# Patient Record
Sex: FEMALE | Race: WHITE | NOT HISPANIC OR LATINO | Employment: FULL TIME | URBAN - METROPOLITAN AREA
[De-identification: names, ages, dates, MRNs, and addresses within clinical notes are randomized per-mention and may not be internally consistent; named-entity substitution may affect disease eponyms.]

---

## 2022-10-27 ENCOUNTER — LAB REQUISITION (OUTPATIENT)
Dept: LAB | Facility: CLINIC | Age: 67
End: 2022-10-27

## 2022-10-27 LAB
HBV SURFACE AB SERPL IA-ACNC: 0.88 M[IU]/ML
HBV SURFACE AB SERPL IA-ACNC: NONREACTIVE M[IU]/ML

## 2022-10-27 PROCEDURE — 86706 HEP B SURFACE ANTIBODY: CPT | Performed by: INTERNAL MEDICINE

## 2022-10-27 PROCEDURE — 86735 MUMPS ANTIBODY: CPT | Performed by: INTERNAL MEDICINE

## 2022-10-27 PROCEDURE — 86765 RUBEOLA ANTIBODY: CPT | Performed by: INTERNAL MEDICINE

## 2022-10-27 PROCEDURE — 86787 VARICELLA-ZOSTER ANTIBODY: CPT | Performed by: INTERNAL MEDICINE

## 2022-10-27 PROCEDURE — 86481 TB AG RESPONSE T-CELL SUSP: CPT | Performed by: INTERNAL MEDICINE

## 2022-10-27 PROCEDURE — 86762 RUBELLA ANTIBODY: CPT | Performed by: INTERNAL MEDICINE

## 2022-10-28 LAB
MEV IGG SER IA-ACNC: >300 AU/ML
MEV IGG SER IA-ACNC: POSITIVE
MUMPS ANTIBODY IGG INSTRUMENT VALUE: >300 AU/ML
MUV IGG SER QL IA: POSITIVE
RUBV IGG SERPL QL IA: 3.86 INDEX
RUBV IGG SERPL QL IA: POSITIVE
VZV IGG SER QL IA: 761.3 INDEX
VZV IGG SER QL IA: POSITIVE

## 2022-10-29 LAB
GAMMA INTERFERON BACKGROUND BLD IA-ACNC: 0.03 IU/ML
M TB IFN-G BLD-IMP: NEGATIVE
M TB IFN-G CD4+ BCKGRND COR BLD-ACNC: 9.97 IU/ML
MITOGEN IGNF BCKGRD COR BLD-ACNC: 0 IU/ML
MITOGEN IGNF BCKGRD COR BLD-ACNC: 0 IU/ML
QUANTIFERON MITOGEN: 10 IU/ML
QUANTIFERON NIL TUBE: 0.03 IU/ML
QUANTIFERON TB1 TUBE: 0.03 IU/ML
QUANTIFERON TB2 TUBE: 0.03

## 2024-07-23 ENCOUNTER — TRANSFERRED RECORDS (OUTPATIENT)
Dept: HEALTH INFORMATION MANAGEMENT | Facility: CLINIC | Age: 69
End: 2024-07-23
Payer: COMMERCIAL

## 2024-07-26 ENCOUNTER — TELEPHONE (OUTPATIENT)
Dept: OPHTHALMOLOGY | Facility: CLINIC | Age: 69
End: 2024-07-26
Payer: COMMERCIAL

## 2024-07-26 NOTE — TELEPHONE ENCOUNTER
----- Message from Iram Tamez sent at 7/23/2024  2:17 PM CDT -----  Regarding: RE: Referral from Shelby Flores OD  New adult strabismus  ----- Message -----  From: Merry Duvall  Sent: 7/23/2024   1:58 PM CDT  To: AJ Hameed; Melanie Jeans  Subject: RE: Referral from LORETTA Vernon!     What should I schedule this under?     Merry Restrepo  ----- Message -----  From: Iram Tamez CO  Sent: 7/23/2024   1:17 PM CDT  To: [facility]  Subject: Referral from Shelby Flores OD                    Please call to schedule with first available with Raul Tapia or Marty    [address] palsy    [phone]    Thank you!   Flori has referral notes to scan in

## 2024-09-12 ENCOUNTER — TELEPHONE (OUTPATIENT)
Dept: OPHTHALMOLOGY | Facility: CLINIC | Age: 69
End: 2024-09-12
Payer: COMMERCIAL

## 2024-09-12 NOTE — TELEPHONE ENCOUNTER
----- Message from Iram Tamez sent at 9/12/2024  2:09 PM CDT -----  Regarding: referall Dr. Flores, OD  Could you try calling her again to set up apt with Dr. Schulz? She called Dr. Flores to say she hasn't heard from us. Looks like you left a message tho.       Thank you!    Myrtle Ramirez    1955    Left [address]th [address] palsy    [phone]

## 2024-09-12 NOTE — TELEPHONE ENCOUNTER
[facility] Call Center    Phone Message    May a detailed message be left on voicemail: yes     Reason for Call: Other: Patient called regarding previous TE for scheduling new neuro with Dr. Washington. She was given a few different appointment dates but states the only day that would work for her so her son can bring her is October 28th. Writer saw no appointments that day so patient wanted to ask the clinic if there was a way to get her seen that day. She states she can come in as early as 730AM . Please call to advise.      Action Taken: Message routed to:  [facility] Eye    Travel Screening: Not Applicable

## 2024-09-12 NOTE — TELEPHONE ENCOUNTER
[facility] Call Center    Phone Message    May a detailed message be left on voicemail: no     Reason for Call: Other: FYI pt called scheduling call center and was given next avail new neuro eye slots with Dr Schulz and is checking with her son for transportation for appt and will be calling scheduling department back either today or on 9/13/24 to schedule her appt      Action Taken: Message routed to:  [facility] eye    Travel Screening: Not Applicable     Date of Service:

## 2024-09-12 NOTE — TELEPHONE ENCOUNTER
----- Message from Iram Tamez sent at 9/12/2024  2:09 PM CDT -----  Regarding: referall Dr. Flores, OD  Could you try calling her again to set up apt with Dr. Schulz? She called Dr. Flores to say she hasn't heard from us. Looks like you left a message tho.       Thank you!    Myrtle Ramirez    1955    Left [address] palsy    [phone]

## 2024-09-13 NOTE — TELEPHONE ENCOUNTER
[facility] Call Center    Phone Message    May a detailed message be left on voicemail: yes     Reason for Call: Other: Pt is returning the clinic's call regarding scheduling with Dr. Schulz on 10/28. She prefers earlier in the day, the better, but she can come any time. Ok to schedule time and call pt with details. Thank you.     Action Taken: Message routed to:  [facility] ([facility]): EYE    Travel Screening: Not Applicable     Date of Service:            CLINIC:  Left vm message with Myrtle stating [facility] doesn't have clinic on the 28th. But we could do Wednesdays or some Thursday and Friday moirnings. Left call center to call back on.     Merry

## 2024-09-20 ENCOUNTER — TRANSFERRED RECORDS (OUTPATIENT)
Dept: HEALTH INFORMATION MANAGEMENT | Facility: CLINIC | Age: 69
End: 2024-09-20

## 2024-09-26 ENCOUNTER — TRANSFERRED RECORDS (OUTPATIENT)
Dept: HEALTH INFORMATION MANAGEMENT | Facility: CLINIC | Age: 69
End: 2024-09-26
Payer: COMMERCIAL

## 2024-10-04 ENCOUNTER — MEDICAL CORRESPONDENCE (OUTPATIENT)
Dept: HEALTH INFORMATION MANAGEMENT | Facility: CLINIC | Age: 69
End: 2024-10-04
Payer: COMMERCIAL

## 2024-10-09 ENCOUNTER — TELEPHONE (OUTPATIENT)
Dept: OTOLARYNGOLOGY | Facility: CLINIC | Age: 69
End: 2024-10-09
Payer: COMMERCIAL

## 2024-10-09 NOTE — TELEPHONE ENCOUNTER
[facility] Call Center    Phone Message    May a detailed message be left on voicemail: yes     Reason for Call: Other: Pt called to schedule appt from referral for imbalance. Pt wants to be seen at [facility]. Sending TE per protocols     Action Taken: Other: [facility]    Travel Screening: Not Applicable     Date of Service:

## 2024-10-17 NOTE — TELEPHONE ENCOUNTER
LVM to schedule      Vestibular Eval with ENT PT for CDP test, then VNG with audiology after      Gave direct number

## 2024-10-18 NOTE — TELEPHONE ENCOUNTER
Patient confirmed scheduled appointment:  Date: 11/18  Time: 845  Provider:   Location: [facility]   Testing/imaging: VNG and CDP test  Additional notes:

## 2024-11-08 ENCOUNTER — OFFICE VISIT (OUTPATIENT)
Dept: OPHTHALMOLOGY | Facility: CLINIC | Age: 69
End: 2024-11-08
Attending: OPHTHALMOLOGY
Payer: COMMERCIAL

## 2024-11-08 DIAGNOSIS — H49.12: Primary | ICD-10-CM

## 2024-11-08 DIAGNOSIS — H53.2 DOUBLE VISION: ICD-10-CM

## 2024-11-08 DIAGNOSIS — H50.22 HYPERTROPIA OF LEFT EYE: ICD-10-CM

## 2024-11-08 PROCEDURE — 92060 SENSORIMOTOR EXAMINATION: CPT

## 2024-11-08 PROCEDURE — G0463 HOSPITAL OUTPT CLINIC VISIT: HCPCS | Performed by: OPHTHALMOLOGY

## 2024-11-08 PROCEDURE — 92060 SENSORIMOTOR EXAMINATION: CPT | Performed by: OPHTHALMOLOGY

## 2024-11-08 PROCEDURE — 99205 OFFICE O/P NEW HI 60 MIN: CPT | Mod: GC | Performed by: OPHTHALMOLOGY

## 2024-11-08 PROCEDURE — 92060 SENSORIMOTOR EXAMINATION: CPT | Mod: 26 | Performed by: OPHTHALMOLOGY

## 2024-11-08 PROCEDURE — 92015 DETERMINE REFRACTIVE STATE: CPT

## 2024-11-08 ASSESSMENT — REFRACTION_WEARINGRX
OD_VPRISM: 6.0
OD_VBASE: BASE DOWN
OS_AXIS: 135
OS_VPRISM: 6.0
OS_VPRISM: 7.0
OD_SPHERE: -2.25
OS_VBASE: BASE DOWN
OD_VPRISM: 6.0
OD_SPHERE: -4.00
OS_AXIS: 141
OS_CYLINDER: +0.50
OD_CYLINDER: +0.75
OS_SPHERE: -2.00
OD_VBASE: BASE UP
OS_SPHERE: -3.75
OS_CYLINDER: +0.50
OD_AXIS: 156
SPECS_TYPE: READING GLS
OD_CYLINDER: +0.50
OD_AXIS: 160
OS_VBASE: BASE DOWN

## 2024-11-08 ASSESSMENT — CONF VISUAL FIELD
OS_INFERIOR_NASAL_RESTRICTION: 0
OD_INFERIOR_NASAL_RESTRICTION: 0
OD_NORMAL: 1
OD_SUPERIOR_NASAL_RESTRICTION: 0
OD_INFERIOR_TEMPORAL_RESTRICTION: 0
OS_SUPERIOR_TEMPORAL_RESTRICTION: 0
METHOD: COUNTING FINGERS
OD_SUPERIOR_TEMPORAL_RESTRICTION: 0
OS_NORMAL: 1
OS_SUPERIOR_NASAL_RESTRICTION: 0
OS_INFERIOR_TEMPORAL_RESTRICTION: 0

## 2024-11-08 ASSESSMENT — CUP TO DISC RATIO
OS_RATIO: 0.2
OD_RATIO: 0.2

## 2024-11-08 ASSESSMENT — VISUAL ACUITY
METHOD: SNELLEN - LINEAR
OD_CC+: -1
CORRECTION_TYPE: GLASSES
OD_CC: 20/40
OS_CC: 20/25

## 2024-11-08 ASSESSMENT — SLIT LAMP EXAM - LIDS
COMMENTS: 1+ DERMATOCHALASIS - UPPER LID
COMMENTS: 1+ DERMATOCHALASIS - UPPER LID

## 2024-11-08 ASSESSMENT — EXTERNAL EXAM - LEFT EYE: OS_EXAM: NORMAL

## 2024-11-08 ASSESSMENT — TONOMETRY
OS_IOP_MMHG: 19
OD_IOP_MMHG: 16
IOP_METHOD: ICARE

## 2024-11-08 ASSESSMENT — REFRACTION_FINALRX
OS_VPRISM: 8.0
OS_VPRISM: 8.0
OD_VPRISM: 7.0
OD_VPRISM: 7.0

## 2024-11-08 ASSESSMENT — REFRACTION_MANIFEST
OD_ADD: +2.75
OS_AXIS: 105
OD_SPHERE: -4.25
OS_SPHERE: -4.00
OD_AXIS: 165
OD_CYLINDER: +0.75
OS_CYLINDER: +0.50
OS_ADD: +2.75

## 2024-11-08 ASSESSMENT — EXTERNAL EXAM - RIGHT EYE: OD_EXAM: NORMAL

## 2024-11-08 NOTE — PROGRESS NOTES
1. Left trochlear nerve palsy    2- CPA meningioma left side, status post craniotomy of the left side in 2021     3. Bilateral epiretinal membrane-patient is still correctable to 20/20 in the left eye and 20/30 in the right eye.  This does not appear to limit her fusion    Patient is a 69 year old female who started to have double vision a few days after her left sided craniotomy for a meningioma Sept 2021. Her diplopia/vertical has been overall stable subjectively since the time of her craniotomy.     She has been followed for remnant meningioma that is located along the lateral aspect of the left gay in the path of the left cranial nerve 4.  Since she has not had any changes in her strabismus subjectively since the sudden onset of diplopia at the time of surgery nor has the size of the remnant meningioma increased, it is very reasonable to offer her a strabismus surgery to help alleviate her diplopia.  The amount of ground in prism is requires for fusion is significantly more than most would tolerate and is likely to induce prismatic distortion to vision.    Discussed the risks, benefits, and alternatives of strabismus surgery and patient wishes to consider her options and let us know later if she wants to proceed.    Per her request I did give her an updated prescription for a bifocal with ground in prism and single vision readers with prism however I strongly suggested that she not fill this prescription and instead proceed with strabismus surgery given the large angle of prism required for fusion (15 prism diopters left hypertropia total) and based upon the incomitant nature of her strabismus which means that prism glasses are not going to function in all gaze positions particularly downgaze.    If she does proceed with strabismus surgery she will need a pair of prism free glasses to wear after surgery.  I also did explain to her that it is likely we would need to perform multiple staged strabismus  surgeries to get her the best outcome.     It is clear to me that with optimal surgical management this patient would have significantly improved visual function.  She is a good strabismus surgery candidate.    Letter to Dr. Flores    Myrtle Ramirez is a pleasant 69 year old White female who presents to my neuro-ophthalmology clinic today having been referred by Dr. 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  8 days later she had another surgery because of otorrhea with placement of fat for CSF leakage.  Right after surgery she noticed double vision, she is not sure if after she woke up from the surgery she had the double vision or not but it started probably a few days after the surgery.     She was patching her eye until January 2022, when she saw Dr. Chow, in [facility].  She was then referred to an orthotist and she was placed in Fresnel prisms which she liked it and she eventually got prism glasses.  She mentions that her double vision has been stable ever since, she was treated with 7 PD prisms in each eye and she is today.  There has not been any changes in her double vision.    She doesn't have a good depth of perception and she has problems coming down of stairs.    She also has balance issues, that started March 2024. This was after she got COVID. She has seen an ENT surgeon, and is having tested for vestibular issues. She also has hearing issues on the right side.     She feels week and more pressure in her head.     Independent historians:  Patient    Review of outside testing:    3/6/24 MRI head wwo contrast  Impression   1. Stable four meningiomas along the left hemisphere.  Most notable   meningioma along the left tentorial incisura demonstrates stable mild mass   effect upon the  "left lateral midbrain/gay.   2.  Stable mild chronic ischemic microvascular disease.     My interpretation performed today of outside testing:  I have independently reviewed MRI performed 3/6/24.  She has an enhancing mass consistent with meningioma along the left tentorium near the 4th and 5th cranial nerve.  This tumor appears in the course of the left cranial nerve IV    Review of outside clinical notes:    7/23/24 Visit with Dr. Flores    4/25/24 -- Visit with Dr. Ponce    Myrtle Ramirez is a 69 y.o. female who presents with:    1. Diplopia  2. Left-sided fourth cranial nerve palsy  3. Cerebellopontine angle meningioma ([facility])  4. Examination of eyes and vision  5. Myopia of both eyes    Stable left hypertropia likely from left 4th nerve palsy following neurosurgery. She is happy in her sunglasses and mostly happy in reading glasses though says she \"can't see\" sometimes to read her bible. Updated rx for distance provided. I would recommend she stay in a total of 15 vertical prism if new glasses are prescribed. I've encouraged her to follow up with ophthalmology for routine exam in case blurry vision at near is more than just refractive. Remainder of exam stable. Follow up with me as needed for diplopia. Myrtle expressed understanding reassurance and agreement with current plan. All questions answered.  Return if symptoms worsen or fail to improve.    Further details of the management plan can be found in the \"Patient Instructions\" section which was printed and given to the patient.  Attending Physician Attestation: Complete documentation of historical and exam elements from today's encounter can be found in the full encounter summary report (not reduplicated in this progress note). I personally obtained the chief complaint(s) and history of present illness. I confirmed and edited as necessary the review of systems, past medical/surgical history, family history, social history, and examination findings as " documented by others; and I examined the patient myself. I personally reviewed the relevant tests, images, and reports as documented above. I formulated and edited as necessary the assessment and plan and discussed the findings and management plan with the patient and family. - Joyce Ponce MD     Past medical history:    Patient Active Problem List   Diagnosis    CARDIOVASCULAR SCREENING; LDL GOAL LESS THAN 160   Meningioma  Shoulder replacement  Knee replacement  Pulmonary emboly   MVR     Patient does not use any medicine now     Family history / social history:  Patient's Mom  because of COPD, dad passed away because of aspiration pneumonia     Patient  reports that she has never smoked. She does not have any smokeless tobacco history on file. She reports current alcohol use. She reports that she does not use drugs.     Exam:  Visual acuity 20/30 -2 right eye 20/20 left eye.  Color vision 11/11 right eye and 11/11 left eye.  Pupils none  Intraocular pressure 16 right eye and 19  left eye.  Anterior segment exam unremarkable.  Fundus exam round pink optic nerves on both sides, ERM on both sides.  Strabismus exam left hypertropia worsening in left gaze and left head tilt consistent with left superior oblique palsy    Cranial nerve exam 3, 6,7, 10 - 12 are normal and symmetric.     She has numbness on the sensory distribution of the left V2 and V3.     She has subjective hearing loss in the right ear.     Tests ordered and interpreted today:    Sensorimotor examination: The patient has full motility in the right eye.  In the left eye there is a -1.5 depression deficit in adduction.  There is a -1 depression deficit in primary gaze and abduction in the left eye. There is mild left inferior oblique overaction.  In primary gaze the patient has a left hypertropia measuring about 16 prism diopters that increases in right gaze to 18 prism diopters and decreases in upgaze to 14-15 prism diopters.  It measures  much larger in downgaze at 25 prism diopters.    On double Miguel laura testing I found her to have 10 degrees extorsion and left eye in primary gaze that increases in downgaze to 20 degrees    We discussed in detail the risks, benefits, and alternatives of eye muscle correction surgery including the very rare risk of death or serious morbidity from a general anesthesia complication and the rare risk of severe vision loss in the operative eye(s) secondary to retinal detachment or endophthalmitis.  We discussed more likely sub-optimal outcomes including the unanticipated need for additional strabismus surgery.  Finally the patient was aware that prisms glasses may be required to optimize single vision following surgery.    After a thorough discussion of these risks, the patient decided to consider strabismus surgery.  The surgical plan is as follows:      1. Eye muscle correction left eye    Left superior oblique advancement +/- left inferior oblique myectomy     Follow-up 1 week after strabismus surgery.    Plan to operate at: [facility]  Prism free glasses for adjustment: Non adjustable. Prescription given for prism free glasses       65 minutes were spent on the date of the encounter by me doing chart review, history and exam, documentation, and further activities as noted above    Complete documentation of historical and exam elements from today's encounter can be found in the full encounter summary report (not reduplicated in this progress note).  I personally obtained the chief complaint(s) and history of present illness.  I confirmed and edited as necessary the review of systems, past medical/surgical history, family history, social history, and examination findings as documented by others; and I examined the patient myself.  I personally reviewed the relevant tests, images, and reports as documented above.  I formulated and edited as necessary the assessment and plan and discussed the findings and management plan with  the patient and family.  I personally reviewed the ophthalmic test(s) associated with this encounter, agree with the interpretation(s) as documented by the resident/fellow, and have edited the corresponding report(s) as necessary.     MD Deshaun Mckinney MD: Neuro-ophthalmology fellow       Precharting:  Deshaun Velazquez MD   Fellow, Neuro-Ophthalmology

## 2024-11-08 NOTE — NURSING NOTE
Chief Complaint(s) and History of Present Illness(es)       Strabismus Evaluation    In both eyes.  Since onset it is stable.  Associated symptoms include imbalance.  Negative for blurred vision.             Comments    Myrtle Ramirez is a 69 year old female sent for consultation by Dr. Flores for strabismus sx consult.  Hx of CPA meningioma, s/p resection 2021.  She noticed vertical diplopia after her brain surgery.  Has tried Fresnel prisms and ground-in prism glasses and gradually increasing prisms.  Current glasses with total of 14pd vertical prism - lab was not able to fully get her full prism correction. Has a lot of balance issues, seeing ENT.  Will get occasional eye pain in the left eye, can occur a few times a month - lasts up to 10 seconds.  Would like to talk about surgery but not a priority at this point.  AJ Ring 11/8/2024 7:49 AM

## 2024-11-08 NOTE — LETTER
2024    RE: Myrtle Ramirez  : 1955  MRN: [id number]    Dear Dr. Flores    Thank you for referring your patient, Myrtle Ramirez, to my neuro-ophthalmology clinic recently.  After a thorough neuro-ophthalmic history and examination, I came to the following conclusions:     1. Left trochlear nerve palsy    2- CPA meningioma left side, status post craniotomy of the left side in      3. Bilateral epiretinal membrane-patient is still correctable to 20/20 in the left eye and 20/30 in the right eye.  This does not appear to limit her fusion    Patient is a 69 year old female who started to have double vision a few days after her left sided craniotomy for a meningioma Sept . Her diplopia/vertical has been overall stable subjectively since the time of her craniotomy.     She has been followed for remnant meningioma that is located along the lateral aspect of the left gay in the path of the left cranial nerve 4.  Since she has not had any changes in her strabismus subjectively since the sudden onset of diplopia at the time of surgery nor has the size of the remnant meningioma increased, it is very reasonable to offer her a strabismus surgery to help alleviate her diplopia.  The amount of ground in prism is requires for fusion is significantly more than most would tolerate and is likely to induce prismatic distortion to vision.    Discussed the risks, benefits, and alternatives of strabismus surgery and patient wishes to consider her options and let us know later if she wants to proceed.    Per her request I did give her an updated prescription for a bifocal with ground in prism and single vision readers with prism however I strongly suggested that she not fill this prescription and instead proceed with strabismus surgery given the large angle of prism required for fusion (15 prism diopters left hypertropia total) and based upon the incomitant nature of her strabismus which means that prism  glasses are not going to function in all gaze positions particularly downgaze.    If she does proceed with strabismus surgery she will need a pair of prism free glasses to wear after surgery.  I also did explain to her that it is likely we would need to perform multiple staged strabismus surgeries to get her the best outcome.     It is clear to me that with optimal surgical management this patient would have significantly improved visual function.  She is a good strabismus surgery candidate.    Letter to Dr. Flores    Myrtle Ramirez is a pleasant 69 year old White female who presents to my neuro-ophthalmology clinic today having been referred by Dr. 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  8 days later she had another surgery because of otorrhea with placement of fat for CSF leakage.  Right after surgery she noticed double vision, she is not sure if after she woke up from the surgery she had the double vision or not but it started probably a few days after the surgery.     She was patching her eye until January 2022, when she saw Dr. Chow, in [facility].  She was then referred to an orthotist and she was placed in [place] prisms which she liked it and she eventually got prism glasses.  She mentions that her double vision has been stable ever since, she was treated with 7 PD prisms in each eye and she is today.  There has not been any changes in her double vision.    She doesn't have a good depth of perception and she has problems coming down of stairs.    She also has balance issues, that started March 2024. This was after she got COVID. She has seen an ENT surgeon, and is having tested for vestibular issues. She also has hearing issues on the right side.     She feels week and more pressure in  "her head.     Independent historians: Patient    Review of outside testing:   3/6/24 MRI head wwo contrast  Impression   1. Stable four meningiomas along the left hemisphere.  Most notable   meningioma along the left tentorial incisura demonstrates stable mild mass   effect upon the left lateral midbrain/gay.   2.  Stable mild chronic ischemic microvascular disease.     My interpretation performed today of outside testing:  I have independently reviewed MRI performed 3/6/24.  She has an enhancing mass consistent with meningioma along the left tentorium near the 4th and 5th cranial nerve.  This tumor appears in the course of the left cranial nerve IV    Review of outside clinical notes:    7/23/24 Visit with Dr. Flores    4/25/24 -- Visit with Dr. Ponce  Myrtle Ramirez is a 69 y.o. female who presents with:    1. Diplopia  2. Left-sided fourth cranial nerve palsy  3. Cerebellopontine angle meningioma ([facility])  4. Examination of eyes and vision  5. Myopia of both eyes    Stable left hypertropia likely from left 4th nerve palsy following neurosurgery. She is happy in her sunglasses and mostly happy in reading glasses though says she \"can't see\" sometimes to read her bible. Updated rx for distance provided. I would recommend she stay in a total of 15 vertical prism if new glasses are prescribed. I've encouraged her to follow up with ophthalmology for routine exam in case blurry vision at near is more than just refractive. Remainder of exam stable. Follow up with me as needed for diplopia. Myrtle expressed understanding reassurance and agreement with current plan. All questions answered.  Return if symptoms worsen or fail to improve.    Further details of the management plan can be found in the \"Patient Instructions\" section which was printed and given to the patient.  Attending Physician Attestation: Complete documentation of historical and exam elements from today's encounter can be found in the full encounter " summary report (not reduplicated in this progress note). I personally obtained the chief complaint(s) and history of present illness. I confirmed and edited as necessary the review of systems, past medical/surgical history, family history, social history, and examination findings as documented by others; and I examined the patient myself. I personally reviewed the relevant tests, images, and reports as documented above. I formulated and edited as necessary the assessment and plan and discussed the findings and management plan with the patient and family. - Joyce Ponce MD     Past medical history:  Patient Active Problem List   Diagnosis    CARDIOVASCULAR SCREENING; LDL GOAL LESS THAN 160   Meningioma  Shoulder replacement  Knee replacement  Pulmonary emboly   MVR     Patient does not use any medicine now     Family history / social history: Patient's Mom  because of COPD, dad passed away because of aspiration pneumonia      Patient  reports that she has never smoked. She does not have any smokeless tobacco history on file. She reports current alcohol use. She reports that she does not use drugs.   Exam: Visual acuity 20/30 -2 right eye 20/20 left eye.  Color vision 11/11 right eye and 11/11 left eye.  Pupils none  Intraocular pressure 16 right eye and 19  left eye.  Anterior segment exam unremarkable.  Fundus exam round pink optic nerves on both sides, ERM on both sides.  Strabismus exam left hypertropia worsening in left gaze and left head tilt consistent with left superior oblique palsy    Cranial nerve exam 3, 6,7, 10 - 12 are normal and symmetric.     She has numbness on the sensory distribution of the left V2 and V3.     She has subjective hearing loss in the right ear.     Tests ordered and interpreted today:  Sensorimotor examination: The patient has full motility in the right eye.  In the left eye there is a -1.5 depression deficit in adduction.  There is a -1 depression deficit in primary gaze  and abduction in the left eye. There is mild left inferior oblique overaction.  In primary gaze the patient has a left hypertropia measuring about 16 prism diopters that increases in right gaze to 18 prism diopters and decreases in upgaze to 14-15 prism diopters.  It measures much larger in downgaze at 25 prism diopters.    On double Miguel laura testing I found her to have 10 degrees extorsion and left eye in primary gaze that increases in downgaze to 20 degrees    We discussed in detail the risks, benefits, and alternatives of eye muscle correction surgery including the very rare risk of death or serious morbidity from a general anesthesia complication and the rare risk of severe vision loss in the operative eye(s) secondary to retinal detachment or endophthalmitis.  We discussed more likely sub-optimal outcomes including the unanticipated need for additional strabismus surgery.  Finally the patient was aware that prisms glasses may be required to optimize single vision following surgery.    After a thorough discussion of these risks, the patient decided to consider strabismus surgery.  The surgical plan is as follows:      1. Eye muscle correction left eye    Left superior oblique advancement +/- left inferior oblique myectomy     Follow-up 1 week after strabismus surgery.    Plan to operate at: [facility]  Prism free glasses for adjustment: Non adjustable. Prescription given for prism free glasses    Again, thank you for trusting me with the care of your patient.  For further exam details, please feel free to contact our office for additional records.  If you wish to contact me regarding this patient please email me at [email or web address] or give my clinic a call to arrange a phone conversation.    Sincerely,    Migel Schulz MD  , Neuro-Ophthalmology and Adult Strabismus Surgery  [facility] in Neuro-Ophthalmology  Department of Ophthalmology and Visual  Neurosciences  [facility]  DX: Trochlear nerve palsy, meningioma, strabismus surgery candidate

## 2024-11-14 ENCOUNTER — MEDICAL CORRESPONDENCE (OUTPATIENT)
Dept: HEALTH INFORMATION MANAGEMENT | Facility: CLINIC | Age: 69
End: 2024-11-14
Payer: COMMERCIAL

## 2024-11-18 ENCOUNTER — OFFICE VISIT (OUTPATIENT)
Dept: AUDIOLOGY | Facility: CLINIC | Age: 69
End: 2024-11-18
Payer: COMMERCIAL

## 2024-11-18 ENCOUNTER — THERAPY VISIT (OUTPATIENT)
Dept: PHYSICAL THERAPY | Facility: CLINIC | Age: 69
End: 2024-11-18
Payer: COMMERCIAL

## 2024-11-18 DIAGNOSIS — H90.3 BILATERAL SENSORINEURAL HEARING LOSS: ICD-10-CM

## 2024-11-18 DIAGNOSIS — R26.89 IMBALANCE: Primary | ICD-10-CM

## 2024-11-18 NOTE — PROGRESS NOTES
Computerized Dynamic Posturography (CDP)     Background Information: Myrtle Ramirez  was seen today for a Computerized Dynamic Posturography (CDP) evaluation. Patient reports symptoms of imbalance and periodic ear pain. Imbalance has been getting progressively worse since April/May 2024.  Medical history of   Past Medical History:   Diagnosis Date    Endometriosis, site unspecified     Gastro-oesophageal reflux disease     Other and unspecified ovarian cyst          Test Results and Procedures:     Sensory Organization Test:   Overall composite equilibrium score was 64.  Age matched normative value is above 45 .  Composite sensory scores   Somatosensory: 69 (age matched norms: 82).   Vision: 26 (age matched norms: 74).   Vestibular: 0 (age matched norms: 50).   Visual Preference: 120 (age matched norms: 71).  Patient s [facility] [place] was midline .  Patient's main balance strategy used in testing was ankle.  Falls were recorded on conditions 2 (1 trial), 4 (2 trials), 5 (all trials), 6 (1 trial).  Imbalance demonstrated on conditions 4-6.  Symptoms reported on conditions 2-6.    Motor Control Test:   The patient s ability to recover from forward and backward translations of the support surface was WNL .  Normal weight symmetry.  Latency and amplitude scaling of responses was appropriate and symmetrical for various platform movements.     Adaptation Test:   The patient s ability to recover from unexpected perturbations of the support surface was WNL   Adaptation was normal for repeat trials of upward and downward tilt of the platform.     Summary and Recommendations:   These findings are consistent with global imbalance. On the SOT, she had decreased use of both somatosensory and visual cues for balance, and she demonstrated no functional use of vestibular cues for balance. Fear of falling also appeared to impact performance on testing.   Continued vestibular rehab may be helpful/beneficial to improve balance and  reduce fall risk.

## 2024-11-19 NOTE — PROGRESS NOTES
AUDIOLOGY REPORT-BALANCE ASSESSMENT    SUBJECTIVE: Myrtle Ramirez, 69 year old, was seen in Audiology at the [facility] on 11/18/2024, for videonystagmography (VNG), referred by Jori Moore M.D. at [facility].     The patient reports ongoing issues with imbalance since spring 2024.  She reports symptoms worsened from May through June.  She reports having COVID prior to the onset of symptoms in January, and notes taking a magnesium threonate supplement from February through April.  She was worried the supplement was causing her symptoms and stopped taking it in June however did not notice any change in her symptoms.  She notices the imbalance primarily when she is up and walking around.  Symptoms can be worsened by dark environments, uneven ground, lack of sleep, side-to-side head movement or bending over.  She reports having to be very careful when she is walking and has the tendency to be pulled to the left.  She also notes that the left side of her body is weaker.  She denies any history of falls.  She reports slight persistent feelings motion post cessation of motion, and occasional feelings of motion while still (rocking or swaying).    Her medical history is significant for brain surgery to remove a meningioma from the brainstem region in September of 2021.  She also has several smaller meningiomas that are being followed with imaging.  She reports having 4th cranial nerve damage from the surgery/residual tumor.  She reports having a CSF leak after surgery which was patched with a fat graft on the left side base of her skull and has a tender spot there.  She experiences occasional brief headaches near that spot, but denies history of migraines.  She reports vision issues with her left eye since the surgery; double vision (has prism lenses), left eye pain, and worsening vision.  She reports being told she would need a strabismus surgery at some point.  She also  reports some head fullness.  She has some sensitivity to bright lights which worsened after the brain surgery.  She also reports loud sounds can be very bothersome for her.  She denies dizziness triggered by loud sounds, pressure changes, or exertion.  Denies hearing her eyes blink or move.  Denies motion intolerance, history of head injuries or concussions, history of eye surgeries, history of cancer/chemotherapy.  Family history is positive for hearing loss in both her parents in older age, and negative for migraines or vertigo.  She notes her dad had Parkinson's disease.    [facility] revealed normal sloping to severe sensorineural hearing loss in the left ear, and mild rising to normal sloping to moderate sensorineural hearing loss in the right ear.  She reports bilateral constant tinnitus that can fluctuate in intensity, and occasional ear pain in either ear.  She denies aural fullness, drainage, or history of ear surgeries.  Myrtle has not taken any antivestibular medications in the past 48 hours.    OBJECTIVE:  Abuse Screening:  Do you feel unsafe at home or work/school? No  Do you feel threatened by someone? No  Does anyone try to keep you from having contact with others, or doing things outside of your home? No  Physical signs of abuse present? No    Dizziness Handicap Inventory (DHI): 32/100 ; mild perceived impairment    Videonystagmography (VNG) testing:  Prescreening:  Tympanograms: Normal mobility on the right, slightly hypermobile on the left. Note: this test is completed to determine the status of the middle ear before irrigations are completed.  Ocular range of motion and ocular counter roll: Normal  Cross/cover:Normal  Head Thrust: Negative     Nystagmus Tests:  Gaze-Horizontal with fixation: Micro-saccades present throughout   Center: No nystagmus   Right: No nystagmus   Left: No nystagmus  Gaze-Vertical with fixation: Micro-saccades present  throughout   Up: No nystagmus   Down: No nystagmus  Gaze with fixation denied: Micro-square wave jerks present intermittently throughout   Center: No nystagmus   Right: No nystagmus   Left: Several beats of 5 degrees/s left beating nystagmus initially (likely endpoint/non-significant)   Up: No nystagmus  High Frequency Headshake:   Horizontal: Negative for nystagmus and symptoms   Vertical: Negative for nystagmus and symptoms.  Square wave jerks noted.    [place]-Hallpike Head Right: Negative for PC-BPPV. No nystagmus.  No symptoms supine, slight wooziness upon sitting.    [place]-Hallpike Head Left: Possible beats of mild up beating left torsional nystagmus supine, but excessive eye blinking.  Mild dizziness for 15 seconds supine, slight symptoms upon sitting.   Repeat [place]-Hallpike Head Left: Slight rotary geotropic nystagmus and mild spinning for 15 seconds, milder symptoms upon sitting with no nystagmus.  Roll Test Head Right: Negative for HC-BPPV. No nystagmus or symptoms.  Square wave jerks noted.  Roll Test Head Left: Negative for HC-BPPV. No nystagmus or symptoms.  Square wave jerks noted.    Positional Testing: Square wave jerks present in all positions, not suppressed with fixation  Positionals: Supine: No nystagmus  Positionals: Body Right: No nystagmus  Positionals: Body Left: No nystagmus  Positionals: Pre-Caloric: No nystagmus    Oculomotor Tests:  Saccades: Normal.  Micro-saccadic intrusions noted throughout  Anti-saccades: Normal; Patient able to perform task  Pursuit: Essentially normal.  Micro-saccades noted at lower frequencies.    Calorics :  (Tested at 44 degrees and 30 degrees Celsius for 30 seconds for warm and cool water, respectively):  Right Warm Eye Speed: 38 degrees per second right beating.  Abnormal fixation.  Left Warm Eye Speed: 48 degrees per second left beating.  Abnormal fixation.  Right Cool Eye Speed: 11 degrees per second left beating  Left Cool Eye Speed: 18 degrees per second right  beating  Difference between ear: 15% right hypofunction. (Greater than 25% considered clinically significant.)  Fixation Index: Overall normal (abnormal fixation noted for both warm irrigations)  Overall caloric test: Normal    Post-Calorics Otoscopy: Normal    ASSESSMENT:  1. Indications of central vestibular system involvement noted on today's exam were as follows:   - Square wave jerks evident throughout testing, with and without fixation    2.  Possible indications of peripheral vestibular system involvement noted on today's exam were as follows:   - Possible mild left PC-BPPV    PLAN:  Recommend vestibular PT follow-up to recheck/treat possible left BPPV.  Follow-up with Jori Moore M.D. regarding today's results and for medical management.  Please call this clinic at [phone] with questions regarding these results or recommendations.       Jackeline Orantes.  Licensed Audiologist  [place] # [id number]

## 2025-03-29 ENCOUNTER — HEALTH MAINTENANCE LETTER (OUTPATIENT)
Age: 70
End: 2025-03-29

## 2025-06-21 ENCOUNTER — HEALTH MAINTENANCE LETTER (OUTPATIENT)
Age: 70
End: 2025-06-21